# Patient Record
Sex: FEMALE | Race: BLACK OR AFRICAN AMERICAN | NOT HISPANIC OR LATINO | Employment: OTHER | URBAN - METROPOLITAN AREA
[De-identification: names, ages, dates, MRNs, and addresses within clinical notes are randomized per-mention and may not be internally consistent; named-entity substitution may affect disease eponyms.]

---

## 2019-05-14 ENCOUNTER — INITIAL PRENATAL (OUTPATIENT)
Dept: OBSTETRICS AND GYNECOLOGY | Facility: CLINIC | Age: 39
End: 2019-05-14

## 2019-05-14 VITALS
SYSTOLIC BLOOD PRESSURE: 130 MMHG | DIASTOLIC BLOOD PRESSURE: 84 MMHG | HEIGHT: 69 IN | WEIGHT: 293 LBS | BODY MASS INDEX: 43.4 KG/M2

## 2019-05-14 DIAGNOSIS — F55.8 ABUSE OF OTHER NON-PSYCHOACTIVE SUBSTANCES: ICD-10-CM

## 2019-05-14 DIAGNOSIS — R53.83 OTHER FATIGUE: ICD-10-CM

## 2019-05-14 DIAGNOSIS — E31.8 OTHER POLYGLANDULAR DYSFUNCTION (HCC): ICD-10-CM

## 2019-05-14 DIAGNOSIS — Z34.80 SUPERVISION OF OTHER NORMAL PREGNANCY, ANTEPARTUM: Primary | ICD-10-CM

## 2019-05-14 DIAGNOSIS — Z11.59 ENCOUNTER FOR SCREENING FOR OTHER VIRAL DISEASES: ICD-10-CM

## 2019-05-14 PROBLEM — O99.343 DEPRESSION AFFECTING PREGNANCY IN THIRD TRIMESTER, ANTEPARTUM: Status: ACTIVE | Noted: 2018-03-26

## 2019-05-14 PROBLEM — O09.529 AMA (ADVANCED MATERNAL AGE) MULTIGRAVIDA 35+: Status: ACTIVE | Noted: 2019-05-14

## 2019-05-14 PROBLEM — F32.A DEPRESSION AFFECTING PREGNANCY IN THIRD TRIMESTER, ANTEPARTUM: Status: ACTIVE | Noted: 2018-03-26

## 2019-05-14 PROBLEM — Z87.59 HISTORY OF PLACENTAL ABRUPTION: Status: ACTIVE | Noted: 2018-02-07

## 2019-05-14 PROBLEM — O99.419: Status: ACTIVE | Noted: 2018-03-26

## 2019-05-14 PROBLEM — I05.9: Status: ACTIVE | Noted: 2018-03-26

## 2019-05-14 LAB
B-HCG UR QL: POSITIVE
INTERNAL NEGATIVE CONTROL: NEGATIVE
INTERNAL POSITIVE CONTROL: POSITIVE
Lab: ABNORMAL

## 2019-05-14 PROCEDURE — 81025 URINE PREGNANCY TEST: CPT | Performed by: OBSTETRICS & GYNECOLOGY

## 2019-05-14 PROCEDURE — 99204 OFFICE O/P NEW MOD 45 MIN: CPT | Performed by: OBSTETRICS & GYNECOLOGY

## 2019-05-14 RX ORDER — DIPHENHYDRAMINE HYDROCHLORIDE 25 MG/1
25 CAPSULE ORAL 3 TIMES DAILY
Qty: 90 TABLET | Refills: 2 | Status: SHIPPED | OUTPATIENT
Start: 2019-05-14

## 2019-05-14 RX ORDER — PNV NO.95/FERROUS FUM/FOLIC AC 28MG-0.8MG
1 TABLET ORAL DAILY
Qty: 30 TABLET | Refills: 11 | Status: SHIPPED | OUTPATIENT
Start: 2019-05-14

## 2019-05-14 NOTE — PATIENT INSTRUCTIONS
"  Nausea/vomiting in pregnancy:  To help with nausea and vomiting you may try the following over the counter products:  Marium chews, marium tea, marium gum  Mint tea, peppermint gum or candy  B6/Doxylamine: to be taken daily, not just when symptoms occur  Pyridoxine (also known as B6): 10 to 25 mg orally every six to eight hours; the maximum treatment dose suggested for pregnant women is 200 mg/day.  Doxylamine (available in some over-the-counter sleeping pills (eg, Unisom Sleep Tabs) and as an antihistamine chewable tablet (Aldex AN)). One-half of the 25 mg over-the-counter tablet or two chewable 5 mg tablets can be used off-label as an antiemetic  · The [employer] of [employer] has released a smart phone application that can help you monitor and manage your symptoms.  The Application is \"Managing NVP,\" and has a green icon that says \"APGO WELLMOM.\"    If these do not work, we may need to try prescription medications.    Please contact us if you are unable to tolerate liquids (even sips of water) for over six to eight hours, have weight loss of over 10% of your body weight, blood in vomit, fever (temp of 100.4 or higher), or other concerning symptoms arise.          Travel During Pregnancy:  • Always use seatbelts.  A lap belt should be worn below the abdomen (across the hips) and the shoulder belt should be worn across the center of your chest (between the breasts) away from your neck.  Do not put the shoulder belt under your arm or behind your back.  Pull any slack out of the belt.  • Air travel is safe in most uncomplicated pregnancies, but we do not recommend air travel past 36 weeks.  Airlines may also have restrictions, so check with your airline before flying.  For some international flights, the travel cut off may be as early as 28 weeks gestation, and some airlines may require letters from your physician.  • When going on long trips in car, plane, train, or bus, frequent " ambulation is important to prevent blood clots in legs and/or lungs.  The following may help with prevention of blood clots in legs: Drink lots of fluid, wear loose-fitting clothing, walk and stretch at regular intervals.    • Avoid areas with Zika outbreaks.  For the latest information, you may visit: www.cdc.gov/travel/notices/.  Resources: , , Committee Opinion 455  Nutrition during pregnancy  • Average weight gain during pregnancy is based on your pre-pregnancy body mass index (BMI).  See below for recommended weight gain:  o Underweight (BMI <18.5), we recommend 28 to 40lb weight gain  o Normal weight (BMI 18.5 to 24.9), we recommend a 25 to 35lb weight gain  o Overweight (BMI 25-29.9), we recommend a 15 to 25lb weight gain  o Obese (BMI >30), we recommend keeping weight gain under 20 lbs.    • You should speak with your physician regarding your specific weight goals for this pregnancy.   • Foods to avoid include:   o Fish: avoid certain types of fish such as shark, swordfish, nancy mackerel, tilefish.  Limit white (albacore) tuna to 6 oz per week.  Choose fish and shellfish such as shrimp, salmon, catfish, [place].  o Food-borne illness: Pregnant women are much more likely to get Listeriosis than non-pregnant women.  To help prevent this, avoid eating unpasteurized milk and unpasteurized milk products, hot dogs/lunch meat/cold cuts unless they are heated until steaming right before serving, refrigerated meat spreads, refrigerated smoked seafood, raw or undercooked seafood/eggs/meat.   • Vitamin D helps development of the baby’s bones and teeth.  Good sources of Vitamin D include milk fortified with Vitamin D and fatty fish such as salmon.    • Folic acid, also known as folate, helps develop the baby’s brain and spine.  You should make sure your vitamin contains extra folic acid - at least 400mcg.    • Iron helps make red blood cells.  You need to make extra red blood cell sin pregnancy.  We  recommend eating Iron-rich foods such as lean red meat, poultry, fish, dried beans and peas, iron-fortified cereals, and prune juice.  You may be recommended an iron supplement.  If so, it is absorbed more easily if taken with vitamin C-rich foods such as citrus fruits or tomatoes.    • It is important to eat a well balanced diet.  A good recourse for nutrition recommendations is: www.choosemyplate.gov.  • Limit caffeine intake to 200mg daily.  Some coffees/teas/sodas have very different levels of caffeine per serving, so check the nutrition labeling.   Resources: , Committee Opinion 548  Exercise during pregnancy  • If you are healthy and your pregnancy is normal, it is safe to continue or start most types of exercise.   Physical activity does not increase your risk of miscarriage, low birth weight or early delivery, but you should discuss specific limitations or any complications with your physician.    • Benefits of exercise during pregnancy include: reduced back pain, less constipation, promotes overall health and healthy weight gain which may decrease risks for certain pregnancy complications such as diabetes and/or preeclampsia.  • The CDC recommends 150 minutes of moderate intensity aerobic exercise per week.  Moderate intensity means that you are moving enough to raise your heart rate and start sweating, but you can talk normally.  Brisk walking, swimming/water workouts, modified yoga/pilates, and use of elliptical machines and/or stationary bikes are examples of aerobic activity.    • Precautions:  o Stay hydrated.  Drink plenty of water before, during and after your workout  o Wear supportive clothing such as a sports bra  o Do not become overheated.  Do not exercise outside when it is very hot or humid  o Avoid lying flat on your back as much as possible  o AVOID contact sports, skydiving, sports that risk falling (such as skiing, surfing, off-road cycling, gymnastics, horseback riding), hot  yoga/hot pilates, scuba diving  • Stop exercising if you experience: bleeding from the vagina, feeling dizzy/faint, shortness of breath before starting exercise, chest pain, headache, muscle weakness, calf pain or swelling, regular uterine contractions, fluid leaking from the vagina.    • Postpartum exercise: Continuing exercise after you deliver your baby will help boost your energy, strengthen muscles, promote better sleep, and relieve stress.  It also may be useful in preventing postpartum depression.  150 minutes of moderate-intensity aerobic activity is recommended.  Types of exercise and when you can start a regular exercise routine may be limited by the type of delivery you had.  Please discuss with your physician prior to resuming or starting exercise.    Resources: ,   Back pain during pregnancy  • Back pain is very common during pregnancy.  It may arise due to strain on your back muscles, weakness of the abdominal muscles, and pregnancy hormones.    • To prevent back pain:  o Wear shoes with good support. Flat shoes and high heels may not have good arch support.  o Consider a firm mattress  o Use good lifting practices.  Do not bend from the waist to pick things up.  Squat down and bend your knees, keeping a straight back.  o Sit in chairs with good back support or use a small pillow behind your lower back.    o Sleep on your side with one or two pillows between your legs  • To ease back pain:   o Exercise can help stretch strained muscles and strengthen weak muscles to promote good posture  • Contact your health care provider with severe pain, pain that persists for more than 2 weeks, fever, burning during urination, or vaginal bleeding.  Resources:     The following are CPT codes for the optional tests in pregnancy:  Cystic fibrosis screenin  Spinal Muscular atrophy screening 75601  InformaSeq with XY (aneuploidy screening) 88593    The ICD 10 code for most pregnancies is  Z34.90

## 2019-05-14 NOTE — PROGRESS NOTES
Initial OB Visit    Chief Complaint   Patient presents with   • Initial Prenatal Visit        Chaim Degroot is being seen today for her first obstetrical visit.  She is a 38 y.o.    10w2d gestation.   FOB: [place]  This is not a planned pregnancy.   OB History    Para Term  AB Living   5 4 3 1   4   SAB TAB Ectopic Molar Multiple Live Births             4      # Outcome Date GA Lbr Aryan/2nd Weight Sex Delivery Anes PTL Lv   5 Current            4 Term 18 39w0d  2722 g (6 lb) F CS-LTranv   [facility]   3 Term 09/20/15 39w0d  3062 g (6 lb 12 oz) F CS-LTranv   [facility]   2  10/23/09 36w0d  1758 g (3 lb 14 oz) F CS-LTranv   [facility]   1 Term 01 40w0d  2438 g (5 lb 6 oz) F Vag-Spont   [facility]          Current obstetric complaints: mild nausea, no vomiting   Prior obstetric issues, potential pregnancy concerns: H/o 3 prior SVDs.  H/o postpartum preeclampsia. Denies h/o GDM, shoulder dystocia, third or fourth degree laceration  Family history of genetic issues (includes FOB): Denies  Prior infections concerning in pregnancy (Rash, fever since LMP): denies  Varicella Hx:immune by reported history  Prior genetic testing: unsure  History of abnormal pap smears: Denies  History of STIs: h/o chlamydia and trichomonas, denies h/o HSV    Past Medical History:   Diagnosis Date   • Hypertension     gestational   • Urogenital trichomoniasis        Past Surgical History:   Procedure Laterality Date   •  SECTION           Current Outpatient Medications:   •  doxylamine (UNISOM) 25 MG tablet, Take 0.5 tablets by mouth At Night As Needed for Nausea., Disp: 45 tablet, Rfl: 2  •  Prenatal Vit-Fe Fumarate-FA (PRENATAL VITAMIN) 27-0.8 MG tablet, Take 1 tablet by mouth Daily., Disp: 30 tablet, Rfl: 11  •  vitamin B-6 (PYRIDOXINE) 25 MG tablet, Take 1 tablet by mouth 3 (Three) Times a Day., Disp: 90 tablet, Rfl: 2    Allergies   Allergen Reactions   • Benzonatate Nausea Only   • Promethazine Nausea Only       Social  "History     Socioeconomic History   • Marital status: Unknown     Spouse name: Not on file   • Number of children: Not on file   • Years of education: Not on file   • Highest education level: Not on file   Tobacco Use   • Smoking status: Current Every Day Smoker     Packs/day: 0.25     Types: Cigarettes   • Smokeless tobacco: Never Used   Substance and Sexual Activity   • Alcohol use: No     Frequency: Never   • Drug use: Yes     Types: Marijuana   • Sexual activity: Yes     Partners: Male     Birth control/protection: None       Family History   Problem Relation Age of Onset   • Breast cancer Neg Hx    • Ovarian cancer Neg Hx    • Uterine cancer Neg Hx    • Colon cancer Neg Hx    • Deep vein thrombosis Neg Hx    • Pulmonary embolism Neg Hx        Review of systems     Constitutional: negative for chills, fevers and negative for fatigue  Eyes: negative  Ears, nose, mouth, throat, and face: negative for hearing loss and nasal congestion  Respiratory: negative for asthma and wheezing  Cardiovascular: negative for chest pain and dyspnea  Gastrointestinal: negative for dyspepsia, dysphagia abdominal pain  Genitourinary:negative for urinary incontinence  Integument/breast: negative for breast lump  Hematologic/lymphatic: negative for bleeding  Musculoskeletal:negative for aches  Neurological: negative for numbness/tingling  Behavioral/Psych: negative for anhedonia  Allergic/Immunologic: negative for rash, allergy         Objective    /84   Ht 175.3 cm (69\")   Wt (!) 164 kg (362 lb)   LMP 03/03/2019 (Exact Date)   BMI 53.46 kg/m²       General Appearance:    Alert, cooperative, in no acute distress, habitus obese   Head:    Normocephalic, without obvious abnormality, atraumatic   Eyes:            Lids and lashes normal, conjunctivae and sclerae normal, no   icterus, no pallor, corneas clear   Ears:    Ears appear intact with no abnormalities noted       Neck:   No adenopathy, supple, trachea midline, no " thyromegaly   Back:     No kyphosis present, no scoliosis present,                       Lungs:     Clear to auscultation,respirations regular, even and                   unlabored    Heart:    Regular rhythm and normal rate, normal S1 and S2, no            murmur, no gallop, no rub, no click   Breast Exam:    No masses, No nipple discharge   Abdomen:     Normal bowel sounds, no masses, no organomegaly, soft        non-tender, non-distended, no guarding, no rebound                 tenderness   Genitalia:    Vulva - BUS-WNL, NEFG    Vagina - No discharge, No bleeding    Cervix - No Lesions, closed     Uterus - Consistent with 10 weeks    Adnexa - No chad, NT    Pelvimetry - clinically adequate, gynecoid pelvis     Extremities:   Moves all extremities well, no edema, no cyanosis, no              redness   Pulses:   Pulses palpable and equal bilaterally   Skin:   No bleeding, bruising or rash   Lymph nodes:   No palpable adenopathy   Neurologic:   Sensation intact, A&O times 3      Assessment  Pregnancy at 10w2d  Obesity  AMA  H/o preeclampsia  H/o 3 prior CS     Plan  Initial labs drawn, GC/CHL screen done  Patient is on Prenatal vitamins  Problem list reviewed and updated.  Reviewed routine prenatal care with the office to include but not limited to:   Zika (travel restrictions/ok to use insect repellant), not to changing cat litter, food restrictions, avoidance of alcohol, tobacco and drugs and saunas/hot tubs, anticipated weight gain/nutrition requirements.  Reviewed nature of practice and hospital.  Reviewed recommended follow up, importance of compliance with care. We reviewed testing in pregnancy including HIV testing and urine drug screen.    Reviewed aneuploidy screening and CF/SMA screening.  We reviewed limitations of testing, possibility of false positive/negative results, possible need for other tests as indicated.  Patient is aware of the increased risk of aneuploidy given advanced maternal  age.  Reviewed with patient importance of compliance as she has been reported as noncompliant in her prior pregnancies.  We discussed the risk of multiple C-sections in the setting of placental abnormalities.  Patient is undecided what she would like for contraception postpartum.  Discussed if sterilization is desired, we typically have to sign papers at least 1 month prior to the surgery.  Recommend PNV, B6/doxylamine for nausea.    Counseled on limitations of ultrasound in pregnancy in detecting aneuploidy/fetal anomalies  All questions answered.   Return in about 1 week (around 5/21/2019) for dating US and 4 weeks for OB visit.      Kiesha Pugh MD

## 2019-05-15 DIAGNOSIS — R73.09 ELEVATED HEMOGLOBIN A1C: Primary | ICD-10-CM

## 2019-05-15 DIAGNOSIS — Z13.1 ENCOUNTER FOR SCREENING FOR DIABETES MELLITUS: ICD-10-CM

## 2019-05-15 LAB — HBA1C MFR BLD: 6 % (ref 4.8–5.6)

## 2019-05-16 ENCOUNTER — TELEPHONE (OUTPATIENT)
Dept: OBSTETRICS AND GYNECOLOGY | Facility: CLINIC | Age: 39
End: 2019-05-16

## 2019-05-16 LAB
ABO GROUP BLD: (no result)
BACTERIA UR CULT: NORMAL
BACTERIA UR CULT: NORMAL
BASOPHILS # BLD AUTO: 0 X10E3/UL (ref 0–0.2)
BASOPHILS NFR BLD AUTO: 0 %
BLD GP AB SCN SERPL QL: NEGATIVE
CONV .: NORMAL
CYTOLOGIST CVX/VAG CYTO: NORMAL
CYTOLOGY CVX/VAG DOC CYTO: NORMAL
CYTOLOGY CVX/VAG DOC THIN PREP: NORMAL
DX ICD CODE: NORMAL
EOSINOPHIL # BLD AUTO: 0.1 X10E3/UL (ref 0–0.4)
EOSINOPHIL NFR BLD AUTO: 1 %
ERYTHROCYTE [DISTWIDTH] IN BLOOD BY AUTOMATED COUNT: 16.5 % (ref 12.3–15.4)
HBV SURFACE AG SERPL QL IA: NEGATIVE
HCT VFR BLD AUTO: 32.8 % (ref 34–46.6)
HCV AB S/CO SERPL IA: <0.1 S/CO RATIO (ref 0–0.9)
HGB BLD-MCNC: 10.1 G/DL (ref 11.1–15.9)
HIV 1 & 2 AB SER-IMP: NORMAL
HIV 1+2 AB+HIV1 P24 AG SERPL QL IA: NON REACTIVE
IMM GRANULOCYTES # BLD AUTO: 0 X10E3/UL (ref 0–0.1)
IMM GRANULOCYTES NFR BLD AUTO: 0 %
LYMPHOCYTES # BLD AUTO: 2.2 X10E3/UL (ref 0.7–3.1)
LYMPHOCYTES NFR BLD AUTO: 24 %
MCH RBC QN AUTO: 25.4 PG (ref 26.6–33)
MCHC RBC AUTO-ENTMCNC: 30.8 G/DL (ref 31.5–35.7)
MCV RBC AUTO: 83 FL (ref 79–97)
MONOCYTES # BLD AUTO: 0.5 X10E3/UL (ref 0.1–0.9)
MONOCYTES NFR BLD AUTO: 6 %
NEUTROPHILS # BLD AUTO: 6.2 X10E3/UL (ref 1.4–7)
NEUTROPHILS NFR BLD AUTO: 69 %
OTHER STN SPEC: NORMAL
PLATELET # BLD AUTO: 392 X10E3/UL (ref 150–379)
RBC # BLD AUTO: 3.97 X10E6/UL (ref 3.77–5.28)
RH BLD: POSITIVE
RPR SER QL: NON REACTIVE
RUBV IGG SERPL IA-ACNC: 1.71 INDEX
STAT OF ADQ CVX/VAG CYTO-IMP: NORMAL
WBC # BLD AUTO: 9 X10E3/UL (ref 3.4–10.8)

## 2019-05-16 RX ORDER — DOCUSATE SODIUM 100 MG/1
100 CAPSULE, LIQUID FILLED ORAL 2 TIMES DAILY
Qty: 60 CAPSULE | Refills: 1 | Status: SHIPPED | OUTPATIENT
Start: 2019-05-16

## 2019-05-16 RX ORDER — FERROUS SULFATE 325(65) MG
325 TABLET ORAL
Qty: 30 TABLET | Refills: 5 | Status: SHIPPED | OUTPATIENT
Start: 2019-05-16 | End: 2019-07-02

## 2019-05-16 NOTE — TELEPHONE ENCOUNTER
Please let her know her Pap smear was normal.  Her blood count was slightly low; I would recommend starting an iron supplementation and stool softener.  Prescriptions sent to pharmacy

## 2019-05-17 ENCOUNTER — TELEPHONE (OUTPATIENT)
Dept: OBSTETRICS AND GYNECOLOGY | Facility: CLINIC | Age: 39
End: 2019-05-17

## 2019-05-17 DIAGNOSIS — O09.523 ELDERLY MULTIGRAVIDA IN THIRD TRIMESTER: Primary | ICD-10-CM

## 2019-05-17 NOTE — TELEPHONE ENCOUNTER
[facility] pt called and would like to know if you could send in something for her for nausea.     She would also like to know if she could get the informaseq testing done.       Thank you

## 2019-05-17 NOTE — TELEPHONE ENCOUNTER
"----- Message from Kiesha Pugh MD sent at 5/15/2019 11:45 PM EDT -----  Please let patient know her A1C was elevated, recommend early 1 hour GCT.  Order placed.    Called pt to inform results. In the middle of informing her results she said: \"hold up repetitive for several times, was on hold for more than 3 minutes.    Called pt again, no answer left a message to return call.   "

## 2019-05-17 NOTE — TELEPHONE ENCOUNTER
Dr. Pugh sent in a prescription for vitamin C B6 and doxylamine.  This is the first thing that she should take for nausea and vomiting in pregnancy.  She should take the vitamin B6 3 times a day.  She may also add the doxylamine to this up to 3 times per day.  Doxylamine may cause drowsiness.  If it is causing her to be too drowsy, she may take it just prior to bed.    I ordered the Informaseq.  She may come into either [facility] or [facility] to have it drawn at her convenience

## 2019-05-18 LAB
AMPHETAMINES UR QL SCN: NEGATIVE NG/ML
BARBITURATES UR QL SCN: NEGATIVE NG/ML
BENZODIAZ UR QL: NEGATIVE NG/ML
BZE UR QL: NEGATIVE NG/ML
C TRACH RRNA SPEC QL NAA+PROBE: NEGATIVE
CANNABINOIDS UR QL SCN: POSITIVE
METHADONE UR QL SCN: NEGATIVE NG/ML
N GONORRHOEA RRNA SPEC QL NAA+PROBE: NEGATIVE
OPIATES UR QL: NEGATIVE NG/ML
PCP UR QL: NEGATIVE NG/ML
PROPOXYPH UR QL SCN: NEGATIVE NG/ML
T VAGINALIS RRNA VAG QL NAA+PROBE: NEGATIVE

## 2019-05-21 ENCOUNTER — PROCEDURE VISIT (OUTPATIENT)
Dept: OBSTETRICS AND GYNECOLOGY | Facility: CLINIC | Age: 39
End: 2019-05-21

## 2019-05-21 ENCOUNTER — TELEPHONE (OUTPATIENT)
Dept: OBSTETRICS AND GYNECOLOGY | Facility: CLINIC | Age: 39
End: 2019-05-21

## 2019-05-21 DIAGNOSIS — Z34.80 SUPERVISION OF OTHER NORMAL PREGNANCY, ANTEPARTUM: Primary | ICD-10-CM

## 2019-05-21 DIAGNOSIS — Z34.91 PREGNANCY WITH UNCERTAIN DATES IN FIRST TRIMESTER: Primary | ICD-10-CM

## 2019-05-21 PROCEDURE — 76817 TRANSVAGINAL US OBSTETRIC: CPT | Performed by: OBSTETRICS & GYNECOLOGY

## 2019-05-21 NOTE — TELEPHONE ENCOUNTER
Please let patient know that we will likely need to get a repeat hcg in 48 hours.  She will also need to do patterning likely for elevated A1C.  Please schedule her a follow up next week to review. Thanks!

## 2019-05-22 ENCOUNTER — TELEPHONE (OUTPATIENT)
Dept: OBSTETRICS AND GYNECOLOGY | Facility: CLINIC | Age: 39
End: 2019-05-22

## 2019-05-22 DIAGNOSIS — F32.A DEPRESSION, UNSPECIFIED DEPRESSION TYPE: Primary | ICD-10-CM

## 2019-05-22 LAB — HCG INTACT+B SERPL-ACNC: NORMAL MIU/ML

## 2019-05-22 NOTE — TELEPHONE ENCOUNTER
11 wk ob pt called to report symptoms of depression.  She denies a desire to harm herself or anyone else.  She was very tearful on the phone, speaking about her other children.  Pt does feel like counseling may help.  If you agree, please place referral and I will by happy to schedule her. Please advise.    Pt # [phone]

## 2019-05-22 NOTE — TELEPHONE ENCOUNTER
Referral placed.  If unable to get her scheduled soon, please schedule her for follow-up with me in the near future and to see within the next week). Thanks!

## 2019-05-22 NOTE — TELEPHONE ENCOUNTER
L/m for pt regarding referral to Psychology for depression.      Please inform pt, she may call [facility] at [phone] to schedule an appointment.    If she feels like she is in crisis, she may call [facility] at [phone].

## 2019-05-22 NOTE — TELEPHONE ENCOUNTER
----- Message from Kiesha Pugh MD sent at 5/20/2019 10:24 AM EDT -----  Please inform patient of negative gonorrhea/chlamydia/trichomonas testing    -Called patient to inform results, no answer. Left a message to return call.

## 2019-05-22 NOTE — TELEPHONE ENCOUNTER
Patient's pregnancy hormone level was greater than 23,000.  I would like the patient to come in for repeat ultrasound on Friday at her [place] location if possible as we were unable to see a definitive intrauterine pregnancy on her last exam.

## 2019-05-24 ENCOUNTER — PROCEDURE VISIT (OUTPATIENT)
Dept: OBSTETRICS AND GYNECOLOGY | Facility: CLINIC | Age: 39
End: 2019-05-24

## 2019-05-24 DIAGNOSIS — E66.01 MATERNAL MORBID OBESITY, ANTEPARTUM, FIRST TRIMESTER (HCC): ICD-10-CM

## 2019-05-24 DIAGNOSIS — O99.211 MATERNAL MORBID OBESITY, ANTEPARTUM, FIRST TRIMESTER (HCC): ICD-10-CM

## 2019-05-24 DIAGNOSIS — O36.80X0 PREGNANCY WITH UNCERTAIN FETAL VIABILITY, SINGLE OR UNSPECIFIED FETUS: Primary | ICD-10-CM

## 2019-05-24 DIAGNOSIS — O09.521 ELDERLY MULTIGRAVIDA IN FIRST TRIMESTER: ICD-10-CM

## 2019-05-24 PROCEDURE — 76817 TRANSVAGINAL US OBSTETRIC: CPT | Performed by: OBSTETRICS & GYNECOLOGY

## 2019-06-04 ENCOUNTER — TELEPHONE (OUTPATIENT)
Dept: OBSTETRICS AND GYNECOLOGY | Facility: CLINIC | Age: 39
End: 2019-06-04

## 2019-06-04 ENCOUNTER — ROUTINE PRENATAL (OUTPATIENT)
Dept: OBSTETRICS AND GYNECOLOGY | Facility: CLINIC | Age: 39
End: 2019-06-04

## 2019-06-04 ENCOUNTER — PROCEDURE VISIT (OUTPATIENT)
Dept: OBSTETRICS AND GYNECOLOGY | Facility: CLINIC | Age: 39
End: 2019-06-04

## 2019-06-04 VITALS — BODY MASS INDEX: 54.2 KG/M2 | SYSTOLIC BLOOD PRESSURE: 113 MMHG | WEIGHT: 293 LBS | DIASTOLIC BLOOD PRESSURE: 72 MMHG

## 2019-06-04 DIAGNOSIS — O36.80X0 PREGNANCY WITH UNCERTAIN FETAL VIABILITY, SINGLE OR UNSPECIFIED FETUS: Primary | ICD-10-CM

## 2019-06-04 PROCEDURE — 76817 TRANSVAGINAL US OBSTETRIC: CPT | Performed by: OBSTETRICS & GYNECOLOGY

## 2019-06-04 PROCEDURE — 99213 OFFICE O/P EST LOW 20 MIN: CPT | Performed by: OBSTETRICS & GYNECOLOGY

## 2019-06-04 NOTE — TELEPHONE ENCOUNTER
Called patient back.  She had questions about her hcg.  Reviewed with patient that her hCG level was greater than 23,000 and typically once we see a crown-rump length on ultrasound we just follow the ultrasonographic findings to see if a fetal heartbeat develops.  Reviewed with patient that I do want her to follow-up on Thursday for repeat ultrasound and patient agrees.  She is having no bleeding or cramping at this time.  She expressed understanding of the plan.

## 2019-06-04 NOTE — TELEPHONE ENCOUNTER
Pt called, would like a call back from you to discuss ultrasound. Pt states that she's confused and nervous about what she was told. Pt can be reached at [phone]

## 2019-06-06 ENCOUNTER — TELEPHONE (OUTPATIENT)
Dept: OBSTETRICS AND GYNECOLOGY | Facility: CLINIC | Age: 39
End: 2019-06-06

## 2019-06-06 ENCOUNTER — ROUTINE PRENATAL (OUTPATIENT)
Dept: OBSTETRICS AND GYNECOLOGY | Facility: CLINIC | Age: 39
End: 2019-06-06

## 2019-06-06 ENCOUNTER — PROCEDURE VISIT (OUTPATIENT)
Dept: OBSTETRICS AND GYNECOLOGY | Facility: CLINIC | Age: 39
End: 2019-06-06

## 2019-06-06 VITALS — WEIGHT: 293 LBS | BODY MASS INDEX: 53.9 KG/M2 | DIASTOLIC BLOOD PRESSURE: 71 MMHG | SYSTOLIC BLOOD PRESSURE: 117 MMHG

## 2019-06-06 DIAGNOSIS — O36.80X0 PREGNANCY WITH UNCERTAIN FETAL VIABILITY, SINGLE OR UNSPECIFIED FETUS: ICD-10-CM

## 2019-06-06 DIAGNOSIS — O02.1 MISSED ABORTION: ICD-10-CM

## 2019-06-06 DIAGNOSIS — O99.211 MATERNAL MORBID OBESITY, ANTEPARTUM, FIRST TRIMESTER (HCC): Primary | ICD-10-CM

## 2019-06-06 DIAGNOSIS — O09.521 ELDERLY MULTIGRAVIDA IN FIRST TRIMESTER: ICD-10-CM

## 2019-06-06 DIAGNOSIS — O02.1 EMBRYONIC DEMISE: Primary | ICD-10-CM

## 2019-06-06 DIAGNOSIS — E66.01 MATERNAL MORBID OBESITY, ANTEPARTUM, FIRST TRIMESTER (HCC): Primary | ICD-10-CM

## 2019-06-06 PROCEDURE — 76817 TRANSVAGINAL US OBSTETRIC: CPT | Performed by: OBSTETRICS & GYNECOLOGY

## 2019-06-06 PROCEDURE — 99213 OFFICE O/P EST LOW 20 MIN: CPT | Performed by: OBSTETRICS & GYNECOLOGY

## 2019-06-06 RX ORDER — HYDROXYZINE HYDROCHLORIDE 25 MG/1
25 TABLET, FILM COATED ORAL EVERY 8 HOURS PRN
Qty: 20 TABLET | Refills: 0 | Status: SHIPPED | OUTPATIENT
Start: 2019-06-06 | End: 2019-07-02

## 2019-06-06 NOTE — TELEPHONE ENCOUNTER
Today, the patient and I did not speak of her mental health.  She did not report any anxiety or ask for an anxiolytic.  I do not prescribe benzodiazepines as an outpatient.  I am comfortable with giving her hydroxyzine or Vistaril for anxiety but please caution the patient that this may cause sedation and I do not recommend taking it while being the primary caregiver to children or driving while taking the medication.  She can be seen tomorrow in the office if she would like to discuss this further.  If she has a mental health provider, would recommend follow-up with them.

## 2019-06-06 NOTE — PROGRESS NOTES
SUBJECTIVE:   Chaim Degroot is a 39 y.o.  who presents today with an embryonic demise based upon ultrasound today showing absent fetal heart tones with an ultrasound approximately 2 weeks ago that showed gestational sac, yolk sac, crown-rump length.   She reports no vaginal bleeding and no cramping.  Her prior pregnancy history is significant for prior  section x3, most recent in 2018.   She denies any fever/chills.    Past Medical History:   Diagnosis Date   • Hypertension     gestational   • Urogenital trichomoniasis       Past Surgical History:   Procedure Laterality Date   •  SECTION        Social History     Tobacco Use   • Smoking status: Current Every Day Smoker     Packs/day: 0.25     Types: Cigarettes   • Smokeless tobacco: Never Used   Substance Use Topics   • Alcohol use: No     Frequency: Never   • Drug use: Yes     Types: Marijuana        OB History    Para Term  AB Living   8 5 3 2 2 4   SAB TAB Ectopic Molar Multiple Live Births   2         4      # Outcome Date GA Lbr Aryan/2nd Weight Sex Delivery Anes PTL Lv   8 Current            7 Term 18 39w0d  2722 g (6 lb) F CS-LTranv   [facility]   6 Term 09/20/15 37w0d  3062 g (6 lb 12 oz) F CS-LTranv   [facility]   5  10/23/09 29w0d  1758 g (3 lb 14 oz) F CS-LTranv   [facility]   4   24w0d    Vag-Spont   FD   3 SAB            2 SAB            1 Term 01 40w0d  2438 g (5 lb 6 oz) F Vag-Spont   [facility]MER          OBJECTIVE:   Vitals:    19 1442   BP: 117/71   Weight: (!) 166 kg (365 lb)      Physical Examination:    General appearance - alert, well appearing, and in no distress    Chest - no tachypnea, retractions or cyanosis   CV- well perfused, no cyanosis   Abdomen - soft, nontender, nondistended, no masses or organomegaly    Pelvic - exam declined by the patient   Neurological - alert, oriented, normal speech, no focal findings or movement disorder noted   Musculoskeletal - no joint tenderness,  deformity or swelling    Extremities - no pedal edema noted   Skin - normal coloration and turgor, no rashes, no suspicious skin lesions noted      Ultrasound crown-rump length 0.48 cm consistent with 6 weeks 1 day absent fetal heartbeat, right complex ovarian cyst 2.12 cm    ASSESSMENT:   Embryonic demise  O positive  H/o C section    PLAN:     Patient was informed of the diagnosis of embryonic demise.  We discussed that given absent fetal heart tones today and US 5/24 showing gestational sac with yolk sac and CRL, this pregnancy is unsuccessful. Patient's questions were answered and condolences were offered. Patient was counseled on options including expectant management, dilation and curettage, and medical management with misoprostol.  Patient was informed of the risks of each procedure including but not limited to bleeding, infection, risks of anesthesia, risks of injury to uterus (such as uterine perforation), need for other surgeries and/or procedures.  She was informed that medical management is often successful but ~15% of patients (exact risk depending on gestational age at time of demise) may require a dilation and curettage secondary to failure of the medication.   Patient chose expectant management at this time.  She will consider her other treatment options and follow-up next week.  Patient was rushed to leave the office due to childcare obligations.  I reviewed with the patient that if she were to choose medical management at home she would need assistance and supervising and caring for her children.  Also reviewed that if she were to choose a D&C, we recommend adult supervision for 24 hours postanesthesia. She was informed to follow-up next week and call with any abnormal bleeding or cramping.  Rh Positive  Contraception: considering permanent sterilization  All questions were answered to patient's apparent satisfaction upon conclusion of our discussion.      ADDENDUM: Pt called after leaving visit  asking for anxiolytic.  During her visit, her mood seemed appropriate and she did not request any medication for this.  I recommended follow-up to discuss further

## 2019-06-07 NOTE — TELEPHONE ENCOUNTER
Called pt to relay Dr. Pugh's message. No answer left a message to return call.     -pt does not need untrasound on 6/11 she needs a gyn follow up not an ob follow up

## 2019-06-12 ENCOUNTER — TELEPHONE (OUTPATIENT)
Dept: OBSTETRICS AND GYNECOLOGY | Facility: CLINIC | Age: 39
End: 2019-06-12

## 2019-06-12 NOTE — TELEPHONE ENCOUNTER
"Called pt , she states she was not able to come yesterday due to needing to take her daughter to [facility].  Pt is wanting to r/s, does she need an u/s as well? I saw that this was scheduled for yesterday also, but it looked like an old appointment.  Pt states she has not started bleeding yet and is requesting the 4 vaginal pills to \"start the process\".  Pt is also requesting Xanax for anxiety, she states she is having a hard time sleeping. She said Xanax has worked in the past.  [place]  "

## 2019-06-12 NOTE — TELEPHONE ENCOUNTER
Please call patient as she was diagnosed with embryonic demise last week.  Desired expectant management and was supposed to follow up. Thanks!

## 2019-06-12 NOTE — TELEPHONE ENCOUNTER
She needs a GYN follow up.  I would like to review use of the medication in person prior to her taking the medication to make sure instructions are reviewed.  I do not routinely prescribe Xanax.  If she has a mental health provider, I would recommend contacting them for follow up.  She can be overbooked Friday afternoon at 2/2:15. Thanks!

## 2019-06-14 ENCOUNTER — TELEPHONE (OUTPATIENT)
Dept: OBSTETRICS AND GYNECOLOGY | Facility: CLINIC | Age: 39
End: 2019-06-14

## 2019-06-14 NOTE — TELEPHONE ENCOUNTER
Please call patient and reschedule.  If unable to reach patient, please send her a letter (certified and non certified) stating that we recommend she get medical attention as not following up may be detrimental to her health. Thanks!

## 2019-06-17 NOTE — TELEPHONE ENCOUNTER
Called pt as she missed her last two appt with Dr. Pugh. No phone answer and not voicemail box available to leave a message. Certified and non certified letter mailed.

## 2019-06-21 ENCOUNTER — HOSPITAL ENCOUNTER (OUTPATIENT)
Facility: HOSPITAL | Age: 39
Discharge: HOME | End: 2019-06-22
Attending: OBSTETRICS & GYNECOLOGY | Admitting: OBSTETRICS & GYNECOLOGY

## 2019-06-21 ENCOUNTER — ANESTHESIA (OUTPATIENT)
Dept: PERIOP | Facility: HOSPITAL | Age: 39
End: 2019-06-21

## 2019-06-21 ENCOUNTER — ANESTHESIA EVENT (OUTPATIENT)
Dept: PERIOP | Facility: HOSPITAL | Age: 39
End: 2019-06-21

## 2019-06-21 DIAGNOSIS — O03.4 INCOMPLETE MISCARRIAGE: ICD-10-CM

## 2019-06-21 LAB
ABO GROUP BLD: NORMAL
BLD GP AB SCN SERPL QL: NEGATIVE
DEPRECATED RDW RBC AUTO: 49.9 FL (ref 37–54)
ERYTHROCYTE [DISTWIDTH] IN BLOOD BY AUTOMATED COUNT: 15.9 % (ref 12.3–15.4)
HCT VFR BLD AUTO: 27.8 % (ref 34–46.6)
HGB BLD-MCNC: 8.3 G/DL (ref 12–15.9)
MCH RBC QN AUTO: 25.9 PG (ref 26.6–33)
MCHC RBC AUTO-ENTMCNC: 29.9 G/DL (ref 31.5–35.7)
MCV RBC AUTO: 86.6 FL (ref 79–97)
PLATELET # BLD AUTO: 272 10*3/MM3 (ref 140–450)
PMV BLD AUTO: 10.8 FL (ref 6–12)
RBC # BLD AUTO: 3.21 10*6/MM3 (ref 3.77–5.28)
RH BLD: POSITIVE
T&S EXPIRATION DATE: NORMAL
WBC NRBC COR # BLD: 9.25 10*3/MM3 (ref 3.4–10.8)

## 2019-06-21 PROCEDURE — 85027 COMPLETE CBC AUTOMATED: CPT | Performed by: OBSTETRICS & GYNECOLOGY

## 2019-06-21 PROCEDURE — 59812 TREATMENT OF MISCARRIAGE: CPT | Performed by: OBSTETRICS & GYNECOLOGY

## 2019-06-21 PROCEDURE — 25010000002 PHENYLEPHRINE PER 1 ML: Performed by: ANESTHESIOLOGY

## 2019-06-21 PROCEDURE — 86900 BLOOD TYPING SEROLOGIC ABO: CPT | Performed by: OBSTETRICS & GYNECOLOGY

## 2019-06-21 PROCEDURE — 25010000002 DEXAMETHASONE PER 1 MG: Performed by: ANESTHESIOLOGY

## 2019-06-21 PROCEDURE — G0378 HOSPITAL OBSERVATION PER HR: HCPCS

## 2019-06-21 PROCEDURE — 86901 BLOOD TYPING SEROLOGIC RH(D): CPT | Performed by: OBSTETRICS & GYNECOLOGY

## 2019-06-21 PROCEDURE — 25010000002 FENTANYL CITRATE (PF) 100 MCG/2ML SOLUTION: Performed by: ANESTHESIOLOGY

## 2019-06-21 PROCEDURE — 88305 TISSUE EXAM BY PATHOLOGIST: CPT | Performed by: OBSTETRICS & GYNECOLOGY

## 2019-06-21 PROCEDURE — 86850 RBC ANTIBODY SCREEN: CPT | Performed by: OBSTETRICS & GYNECOLOGY

## 2019-06-21 PROCEDURE — S0260 H&P FOR SURGERY: HCPCS | Performed by: OBSTETRICS & GYNECOLOGY

## 2019-06-21 PROCEDURE — 25010000002 ONDANSETRON PER 1 MG: Performed by: ANESTHESIOLOGY

## 2019-06-21 PROCEDURE — 25010000002 MIDAZOLAM PER 1 MG: Performed by: ANESTHESIOLOGY

## 2019-06-21 PROCEDURE — 25010000002 PROPOFOL 10 MG/ML EMULSION: Performed by: ANESTHESIOLOGY

## 2019-06-21 PROCEDURE — 25010000002 SUCCINYLCHOLINE PER 20 MG: Performed by: ANESTHESIOLOGY

## 2019-06-21 RX ORDER — OXYCODONE HYDROCHLORIDE AND ACETAMINOPHEN 5; 325 MG/1; MG/1
1 TABLET ORAL EVERY 4 HOURS PRN
Status: DISCONTINUED | OUTPATIENT
Start: 2019-06-21 | End: 2019-06-22 | Stop reason: HOSPADM

## 2019-06-21 RX ORDER — DIPHENHYDRAMINE HCL 25 MG
25 CAPSULE ORAL
Status: DISCONTINUED | OUTPATIENT
Start: 2019-06-21 | End: 2019-06-21 | Stop reason: HOSPADM

## 2019-06-21 RX ORDER — ONDANSETRON 4 MG/1
4 TABLET, FILM COATED ORAL EVERY 6 HOURS PRN
Status: DISCONTINUED | OUTPATIENT
Start: 2019-06-21 | End: 2019-06-22 | Stop reason: HOSPADM

## 2019-06-21 RX ORDER — DIPHENHYDRAMINE HYDROCHLORIDE 50 MG/ML
12.5 INJECTION INTRAMUSCULAR; INTRAVENOUS
Status: DISCONTINUED | OUTPATIENT
Start: 2019-06-21 | End: 2019-06-21 | Stop reason: HOSPADM

## 2019-06-21 RX ORDER — SODIUM CHLORIDE 0.9 % (FLUSH) 0.9 %
1-10 SYRINGE (ML) INJECTION AS NEEDED
Status: DISCONTINUED | OUTPATIENT
Start: 2019-06-21 | End: 2019-06-21 | Stop reason: HOSPADM

## 2019-06-21 RX ORDER — MIDAZOLAM HYDROCHLORIDE 1 MG/ML
1 INJECTION INTRAMUSCULAR; INTRAVENOUS
Status: DISCONTINUED | OUTPATIENT
Start: 2019-06-21 | End: 2019-06-21 | Stop reason: HOSPADM

## 2019-06-21 RX ORDER — ONDANSETRON 2 MG/ML
4 INJECTION INTRAMUSCULAR; INTRAVENOUS ONCE AS NEEDED
Status: DISCONTINUED | OUTPATIENT
Start: 2019-06-21 | End: 2019-06-21 | Stop reason: HOSPADM

## 2019-06-21 RX ORDER — NAPROXEN 250 MG/1
250 TABLET ORAL 2 TIMES DAILY PRN
Status: DISCONTINUED | OUTPATIENT
Start: 2019-06-21 | End: 2019-06-22 | Stop reason: HOSPADM

## 2019-06-21 RX ORDER — FENTANYL CITRATE 50 UG/ML
INJECTION, SOLUTION INTRAMUSCULAR; INTRAVENOUS
Status: DISPENSED
Start: 2019-06-21 | End: 2019-06-22

## 2019-06-21 RX ORDER — PROMETHAZINE HYDROCHLORIDE 25 MG/1
25 TABLET ORAL ONCE AS NEEDED
Status: DISCONTINUED | OUTPATIENT
Start: 2019-06-21 | End: 2019-06-21 | Stop reason: HOSPADM

## 2019-06-21 RX ORDER — MAGNESIUM HYDROXIDE 1200 MG/15ML
LIQUID ORAL AS NEEDED
Status: DISCONTINUED | OUTPATIENT
Start: 2019-06-21 | End: 2019-06-21 | Stop reason: HOSPADM

## 2019-06-21 RX ORDER — OXYTOCIN 10 [USP'U]/ML
INJECTION, SOLUTION INTRAMUSCULAR; INTRAVENOUS
Status: DISPENSED
Start: 2019-06-21 | End: 2019-06-22

## 2019-06-21 RX ORDER — PROPOFOL 10 MG/ML
VIAL (ML) INTRAVENOUS AS NEEDED
Status: DISCONTINUED | OUTPATIENT
Start: 2019-06-21 | End: 2019-06-21 | Stop reason: SURG

## 2019-06-21 RX ORDER — LIDOCAINE HYDROCHLORIDE 20 MG/ML
INJECTION, SOLUTION INFILTRATION; PERINEURAL AS NEEDED
Status: DISCONTINUED | OUTPATIENT
Start: 2019-06-21 | End: 2019-06-21 | Stop reason: SURG

## 2019-06-21 RX ORDER — ACETAMINOPHEN 325 MG/1
650 TABLET ORAL ONCE AS NEEDED
Status: DISCONTINUED | OUTPATIENT
Start: 2019-06-21 | End: 2019-06-21 | Stop reason: HOSPADM

## 2019-06-21 RX ORDER — PROMETHAZINE HYDROCHLORIDE 25 MG/1
25 SUPPOSITORY RECTAL ONCE AS NEEDED
Status: DISCONTINUED | OUTPATIENT
Start: 2019-06-21 | End: 2019-06-21 | Stop reason: HOSPADM

## 2019-06-21 RX ORDER — FENTANYL CITRATE 50 UG/ML
INJECTION, SOLUTION INTRAMUSCULAR; INTRAVENOUS AS NEEDED
Status: DISCONTINUED | OUTPATIENT
Start: 2019-06-21 | End: 2019-06-21 | Stop reason: SURG

## 2019-06-21 RX ORDER — EPHEDRINE SULFATE 50 MG/ML
5 INJECTION, SOLUTION INTRAVENOUS ONCE AS NEEDED
Status: DISCONTINUED | OUTPATIENT
Start: 2019-06-21 | End: 2019-06-21 | Stop reason: HOSPADM

## 2019-06-21 RX ORDER — HYDROCODONE BITARTRATE AND ACETAMINOPHEN 7.5; 325 MG/1; MG/1
1 TABLET ORAL ONCE AS NEEDED
Status: DISCONTINUED | OUTPATIENT
Start: 2019-06-21 | End: 2019-06-21 | Stop reason: HOSPADM

## 2019-06-21 RX ORDER — FENTANYL CITRATE 50 UG/ML
50 INJECTION, SOLUTION INTRAMUSCULAR; INTRAVENOUS
Status: DISCONTINUED | OUTPATIENT
Start: 2019-06-21 | End: 2019-06-21 | Stop reason: HOSPADM

## 2019-06-21 RX ORDER — SUCCINYLCHOLINE CHLORIDE 20 MG/ML
INJECTION INTRAMUSCULAR; INTRAVENOUS AS NEEDED
Status: DISCONTINUED | OUTPATIENT
Start: 2019-06-21 | End: 2019-06-21 | Stop reason: SURG

## 2019-06-21 RX ORDER — NALOXONE HCL 0.4 MG/ML
0.2 VIAL (ML) INJECTION AS NEEDED
Status: DISCONTINUED | OUTPATIENT
Start: 2019-06-21 | End: 2019-06-21 | Stop reason: HOSPADM

## 2019-06-21 RX ORDER — ONDANSETRON 2 MG/ML
4 INJECTION INTRAMUSCULAR; INTRAVENOUS EVERY 6 HOURS PRN
Status: DISCONTINUED | OUTPATIENT
Start: 2019-06-21 | End: 2019-06-22 | Stop reason: HOSPADM

## 2019-06-21 RX ORDER — FAMOTIDINE 10 MG/ML
20 INJECTION, SOLUTION INTRAVENOUS ONCE
Status: COMPLETED | OUTPATIENT
Start: 2019-06-21 | End: 2019-06-21

## 2019-06-21 RX ORDER — LIDOCAINE HYDROCHLORIDE 10 MG/ML
0.5 INJECTION, SOLUTION EPIDURAL; INFILTRATION; INTRACAUDAL; PERINEURAL ONCE AS NEEDED
Status: DISCONTINUED | OUTPATIENT
Start: 2019-06-21 | End: 2019-06-21 | Stop reason: HOSPADM

## 2019-06-21 RX ORDER — DEXTROSE AND SODIUM CHLORIDE 5; .45 G/100ML; G/100ML
100 INJECTION, SOLUTION INTRAVENOUS CONTINUOUS
Status: DISCONTINUED | OUTPATIENT
Start: 2019-06-21 | End: 2019-06-22 | Stop reason: HOSPADM

## 2019-06-21 RX ORDER — ONDANSETRON 2 MG/ML
INJECTION INTRAMUSCULAR; INTRAVENOUS AS NEEDED
Status: DISCONTINUED | OUTPATIENT
Start: 2019-06-21 | End: 2019-06-21 | Stop reason: SURG

## 2019-06-21 RX ORDER — HYDROXYZINE HYDROCHLORIDE 25 MG/1
25 TABLET, FILM COATED ORAL EVERY 8 HOURS PRN
Status: DISCONTINUED | OUTPATIENT
Start: 2019-06-21 | End: 2019-06-22 | Stop reason: HOSPADM

## 2019-06-21 RX ORDER — FLUMAZENIL 0.1 MG/ML
0.2 INJECTION INTRAVENOUS AS NEEDED
Status: DISCONTINUED | OUTPATIENT
Start: 2019-06-21 | End: 2019-06-21 | Stop reason: HOSPADM

## 2019-06-21 RX ORDER — PROMETHAZINE HYDROCHLORIDE 25 MG/ML
6.25 INJECTION, SOLUTION INTRAMUSCULAR; INTRAVENOUS
Status: DISCONTINUED | OUTPATIENT
Start: 2019-06-21 | End: 2019-06-21 | Stop reason: HOSPADM

## 2019-06-21 RX ORDER — DEXAMETHASONE SODIUM PHOSPHATE 10 MG/ML
INJECTION INTRAMUSCULAR; INTRAVENOUS AS NEEDED
Status: DISCONTINUED | OUTPATIENT
Start: 2019-06-21 | End: 2019-06-21 | Stop reason: SURG

## 2019-06-21 RX ORDER — OXYCODONE AND ACETAMINOPHEN 7.5; 325 MG/1; MG/1
1 TABLET ORAL ONCE AS NEEDED
Status: DISCONTINUED | OUTPATIENT
Start: 2019-06-21 | End: 2019-06-21 | Stop reason: HOSPADM

## 2019-06-21 RX ORDER — HYDRALAZINE HYDROCHLORIDE 20 MG/ML
5 INJECTION INTRAMUSCULAR; INTRAVENOUS
Status: DISCONTINUED | OUTPATIENT
Start: 2019-06-21 | End: 2019-06-21 | Stop reason: HOSPADM

## 2019-06-21 RX ORDER — MIDAZOLAM HYDROCHLORIDE 1 MG/ML
2 INJECTION INTRAMUSCULAR; INTRAVENOUS
Status: DISCONTINUED | OUTPATIENT
Start: 2019-06-21 | End: 2019-06-21 | Stop reason: HOSPADM

## 2019-06-21 RX ORDER — SODIUM CHLORIDE, SODIUM LACTATE, POTASSIUM CHLORIDE, CALCIUM CHLORIDE 600; 310; 30; 20 MG/100ML; MG/100ML; MG/100ML; MG/100ML
9 INJECTION, SOLUTION INTRAVENOUS CONTINUOUS
Status: DISCONTINUED | OUTPATIENT
Start: 2019-06-21 | End: 2019-06-21

## 2019-06-21 RX ORDER — PROMETHAZINE HYDROCHLORIDE 25 MG/ML
12.5 INJECTION, SOLUTION INTRAMUSCULAR; INTRAVENOUS ONCE AS NEEDED
Status: DISCONTINUED | OUTPATIENT
Start: 2019-06-21 | End: 2019-06-21 | Stop reason: HOSPADM

## 2019-06-21 RX ORDER — HYDROMORPHONE HYDROCHLORIDE 1 MG/ML
0.5 INJECTION, SOLUTION INTRAMUSCULAR; INTRAVENOUS; SUBCUTANEOUS
Status: DISCONTINUED | OUTPATIENT
Start: 2019-06-21 | End: 2019-06-21 | Stop reason: HOSPADM

## 2019-06-21 RX ORDER — OXYTOCIN/RINGER'S LACTATE 10/500ML
PLASTIC BAG, INJECTION (ML) INTRAVENOUS AS NEEDED
Status: DISCONTINUED | OUTPATIENT
Start: 2019-06-21 | End: 2019-06-21 | Stop reason: SURG

## 2019-06-21 RX ORDER — LABETALOL HYDROCHLORIDE 5 MG/ML
5 INJECTION, SOLUTION INTRAVENOUS
Status: DISCONTINUED | OUTPATIENT
Start: 2019-06-21 | End: 2019-06-21 | Stop reason: HOSPADM

## 2019-06-21 RX ADMIN — SODIUM CHLORIDE, POTASSIUM CHLORIDE, SODIUM LACTATE AND CALCIUM CHLORIDE 9 ML/HR: 600; 310; 30; 20 INJECTION, SOLUTION INTRAVENOUS at 20:04

## 2019-06-21 RX ADMIN — PHENYLEPHRINE HYDROCHLORIDE 100 MCG: 10 INJECTION INTRAVENOUS at 20:58

## 2019-06-21 RX ADMIN — FENTANYL CITRATE 50 MCG: 50 INJECTION INTRAMUSCULAR; INTRAVENOUS at 20:23

## 2019-06-21 RX ADMIN — PHENYLEPHRINE HYDROCHLORIDE 100 MCG: 10 INJECTION INTRAVENOUS at 21:01

## 2019-06-21 RX ADMIN — DEXAMETHASONE SODIUM PHOSPHATE 10 MG: 10 INJECTION INTRAMUSCULAR; INTRAVENOUS at 20:25

## 2019-06-21 RX ADMIN — PROPOFOL 50 MG: 10 INJECTION, EMULSION INTRAVENOUS at 20:40

## 2019-06-21 RX ADMIN — ONDANSETRON 4 MG: 2 INJECTION INTRAMUSCULAR; INTRAVENOUS at 20:38

## 2019-06-21 RX ADMIN — PHENYLEPHRINE HYDROCHLORIDE 100 MCG: 10 INJECTION INTRAVENOUS at 20:48

## 2019-06-21 RX ADMIN — FENTANYL CITRATE 50 MCG: 50 INJECTION INTRAMUSCULAR; INTRAVENOUS at 21:46

## 2019-06-21 RX ADMIN — PHENYLEPHRINE HYDROCHLORIDE 100 MCG: 10 INJECTION INTRAVENOUS at 20:51

## 2019-06-21 RX ADMIN — PROPOFOL 200 MG: 10 INJECTION, EMULSION INTRAVENOUS at 20:25

## 2019-06-21 RX ADMIN — SUCCINYLCHOLINE CHLORIDE 170 MG: 20 INJECTION, SOLUTION INTRAMUSCULAR; INTRAVENOUS; PARENTERAL at 20:25

## 2019-06-21 RX ADMIN — PHENYLEPHRINE HYDROCHLORIDE 100 MCG: 10 INJECTION INTRAVENOUS at 20:36

## 2019-06-21 RX ADMIN — FENTANYL CITRATE 50 MCG: 50 INJECTION INTRAMUSCULAR; INTRAVENOUS at 20:40

## 2019-06-21 RX ADMIN — PHENYLEPHRINE HYDROCHLORIDE 200 MCG: 10 INJECTION INTRAVENOUS at 21:09

## 2019-06-21 RX ADMIN — DEXTROSE AND SODIUM CHLORIDE 100 ML/HR: 5; 450 INJECTION, SOLUTION INTRAVENOUS at 23:59

## 2019-06-21 RX ADMIN — OXYTOCIN 20 UNITS: 10 INJECTION, SOLUTION INTRAMUSCULAR; INTRAVENOUS at 20:58

## 2019-06-21 RX ADMIN — MIDAZOLAM 2 MG: 1 INJECTION INTRAMUSCULAR; INTRAVENOUS at 20:04

## 2019-06-21 RX ADMIN — FAMOTIDINE 20 MG: 10 INJECTION INTRAVENOUS at 20:04

## 2019-06-21 RX ADMIN — LIDOCAINE HYDROCHLORIDE 100 MG: 20 INJECTION, SOLUTION INFILTRATION; PERINEURAL at 20:24

## 2019-06-21 RX ADMIN — FENTANYL CITRATE 50 MCG: 50 INJECTION INTRAMUSCULAR; INTRAVENOUS at 20:03

## 2019-06-21 NOTE — ANESTHESIA PREPROCEDURE EVALUATION
Anesthesia Evaluation     Patient summary reviewed   no history of anesthetic complications:  NPO Solid Status: > 8 hours  NPO Liquid Status: > 2 hours           Airway   Mallampati: II  TM distance: >3 FB  Neck ROM: full  Dental      Pulmonary     breath sounds clear to auscultation  (+) a smoker Current Abstained day of surgery,   (-) shortness of breath  Cardiovascular   Exercise tolerance: good (4-7 METS)    Rhythm: regular  Rate: normal    (+) hypertension,   (-) angina, [place]      Neuro/Psych  GI/Hepatic/Renal/Endo    (+) morbid obesity,      Musculoskeletal     Abdominal    Substance History      OB/GYN          Other                        Anesthesia Plan    ASA 3 - emergent     general     intravenous induction   Anesthetic plan, all risks, benefits, and alternatives have been provided, discussed and informed consent has been obtained with: patient.

## 2019-06-22 ENCOUNTER — APPOINTMENT (OUTPATIENT)
Dept: ULTRASOUND IMAGING | Facility: HOSPITAL | Age: 39
End: 2019-06-22

## 2019-06-22 VITALS
TEMPERATURE: 97.7 F | RESPIRATION RATE: 18 BRPM | DIASTOLIC BLOOD PRESSURE: 60 MMHG | SYSTOLIC BLOOD PRESSURE: 115 MMHG | HEART RATE: 59 BPM | OXYGEN SATURATION: 100 %

## 2019-06-22 PROBLEM — E66.01 MORBID OBESITY WITH BMI OF 50.0-59.9, ADULT (HCC): Status: ACTIVE | Noted: 2019-06-22

## 2019-06-22 PROBLEM — D50.0 IRON DEFICIENCY ANEMIA DUE TO CHRONIC BLOOD LOSS: Status: ACTIVE | Noted: 2019-06-22

## 2019-06-22 PROBLEM — O03.1 INCOMPLETE ABORTION WITH DELAYED OR EXCESSIVE HEMORRHAGE: Status: ACTIVE | Noted: 2019-06-22

## 2019-06-22 LAB
ALBUMIN SERPL-MCNC: 3.6 G/DL (ref 3.5–5.2)
ALBUMIN/GLOB SERPL: 1.3 G/DL
ALP SERPL-CCNC: 68 U/L (ref 39–117)
ALT SERPL W P-5'-P-CCNC: 9 U/L (ref 1–33)
ANION GAP SERPL CALCULATED.3IONS-SCNC: 9.7 MMOL/L
AST SERPL-CCNC: 9 U/L (ref 1–32)
BILIRUB SERPL-MCNC: <0.2 MG/DL (ref 0.2–1.2)
BUN BLD-MCNC: 10 MG/DL (ref 6–20)
BUN/CREAT SERPL: 14.3 (ref 7–25)
CALCIUM SPEC-SCNC: 8.4 MG/DL (ref 8.6–10.5)
CHLORIDE SERPL-SCNC: 104 MMOL/L (ref 98–107)
CO2 SERPL-SCNC: 22.3 MMOL/L (ref 22–29)
CREAT BLD-MCNC: 0.7 MG/DL (ref 0.57–1)
DEPRECATED RDW RBC AUTO: 50.1 FL (ref 37–54)
ERYTHROCYTE [DISTWIDTH] IN BLOOD BY AUTOMATED COUNT: 15.7 % (ref 12.3–15.4)
GFR SERPL CREATININE-BSD FRML MDRD: 114 ML/MIN/1.73
GLOBULIN UR ELPH-MCNC: 2.8 GM/DL
GLUCOSE BLD-MCNC: 179 MG/DL (ref 65–99)
HCT VFR BLD AUTO: 24.6 % (ref 34–46.6)
HGB BLD-MCNC: 7.4 G/DL (ref 12–15.9)
MCH RBC QN AUTO: 26.2 PG (ref 26.6–33)
MCHC RBC AUTO-ENTMCNC: 30.1 G/DL (ref 31.5–35.7)
MCV RBC AUTO: 87.2 FL (ref 79–97)
PLATELET # BLD AUTO: 279 10*3/MM3 (ref 140–450)
PMV BLD AUTO: 10.9 FL (ref 6–12)
POTASSIUM BLD-SCNC: 4.3 MMOL/L (ref 3.5–5.2)
PROT SERPL-MCNC: 6.4 G/DL (ref 6–8.5)
RBC # BLD AUTO: 2.82 10*6/MM3 (ref 3.77–5.28)
SODIUM BLD-SCNC: 136 MMOL/L (ref 136–145)
WBC NRBC COR # BLD: 10.99 10*3/MM3 (ref 3.4–10.8)

## 2019-06-22 PROCEDURE — G0378 HOSPITAL OBSERVATION PER HR: HCPCS

## 2019-06-22 PROCEDURE — A9270 NON-COVERED ITEM OR SERVICE: HCPCS | Performed by: OBSTETRICS & GYNECOLOGY

## 2019-06-22 PROCEDURE — 63710000001 OXYCODONE-ACETAMINOPHEN 5-325 MG TABLET: Performed by: OBSTETRICS & GYNECOLOGY

## 2019-06-22 PROCEDURE — 85027 COMPLETE CBC AUTOMATED: CPT | Performed by: OBSTETRICS & GYNECOLOGY

## 2019-06-22 PROCEDURE — 76856 US EXAM PELVIC COMPLETE: CPT

## 2019-06-22 PROCEDURE — 80053 COMPREHEN METABOLIC PANEL: CPT | Performed by: OBSTETRICS & GYNECOLOGY

## 2019-06-22 PROCEDURE — 63710000001 NAPROXEN 250 MG TABLET: Performed by: OBSTETRICS & GYNECOLOGY

## 2019-06-22 PROCEDURE — 99024 POSTOP FOLLOW-UP VISIT: CPT | Performed by: OBSTETRICS & GYNECOLOGY

## 2019-06-22 RX ORDER — OXYCODONE HYDROCHLORIDE AND ACETAMINOPHEN 5; 325 MG/1; MG/1
1-2 TABLET ORAL EVERY 4 HOURS PRN
Qty: 16 TABLET | Refills: 0 | Status: SHIPPED | OUTPATIENT
Start: 2019-06-22 | End: 2019-07-01

## 2019-06-22 RX ORDER — IBUPROFEN 800 MG/1
800 TABLET ORAL EVERY 8 HOURS PRN
Qty: 21 TABLET | Refills: 0 | Status: SHIPPED | OUTPATIENT
Start: 2019-06-22

## 2019-06-22 RX ORDER — FERROUS SULFATE 325(65) MG
325 TABLET ORAL
Qty: 90 TABLET | Refills: 1 | Status: SHIPPED | OUTPATIENT
Start: 2019-06-22

## 2019-06-22 RX ADMIN — NAPROXEN 250 MG: 250 TABLET ORAL at 10:10

## 2019-06-22 RX ADMIN — OXYCODONE HYDROCHLORIDE AND ACETAMINOPHEN 1 TABLET: 5; 325 TABLET ORAL at 10:10

## 2019-06-22 RX ADMIN — OXYCODONE HYDROCHLORIDE AND ACETAMINOPHEN 1 TABLET: 5; 325 TABLET ORAL at 05:18

## 2019-06-22 NOTE — ANESTHESIA POSTPROCEDURE EVALUATION
Patient: Chaim Degroot    Procedure Summary     Date:  06/21/19 Room / Location:  [facility] OR  / [facility] MAIN OR    Anesthesia Start:  2018 Anesthesia Stop:  2128    Procedure:  DILATATION AND CURETTAGE WITH SUCTION (N/A Vagina) Diagnosis:      Surgeon:  Elbert Lynch MD Provider:  Alexis Sen MD    Anesthesia Type:  general ASA Status:  3 - Emergent          Anesthesia Type: general  Last vitals  BP   118/67 (06/21/19 2127)   Temp   37.1 °C (98.8 °F) (06/21/19 2127)   Pulse   96 (06/21/19 2127)   Resp   18 (06/21/19 2127)     SpO2   94 % (06/21/19 2127)     Post Anesthesia Care and Evaluation    Patient location during evaluation: bedside  Patient participation: complete - patient participated  Level of consciousness: awake and alert  Pain management: adequate  Airway patency: patent  Anesthetic complications: No anesthetic complications    Cardiovascular status: acceptable  Respiratory status: acceptable  Hydration status: acceptable    Comments: /67 (BP Location: Left arm, Patient Position: Lying)   Pulse 96   Temp 37.1 °C (98.8 °F) (Oral)   Resp 18   LMP 03/03/2019 (Exact Date)   SpO2 94%

## 2019-06-22 NOTE — H&P
H&P Note    Patient Identification:  Name: Chaim Degroot  Age: 39 y.o.  Sex: female  :  1980  MRN: [id number]                       Chief Complaint: Heavy vaginal bleeding    History of Present Illness:   The patient is a 39-year-old  8 para 5 AB 2 black female who is followed in the office with a 6 weeks size missed  and comes in today being transferred from [facility] emergency room with heavy vaginal bleeding and hemoglobin 9.3.  Blood type is Rh+.  Examination by the nurse and ER doc revealed numerous large clots without parts of conception.  She is now being brought to the OR for suction dilatation and curettage in order to stop her bleeding from actively miscarrying.    Problem List:  [facility]  Past Medical History:  Past Medical History:   Diagnosis Date   • Hypertension     gestational   • Urogenital trichomoniasis      Past Surgical History:  Past Surgical History:   Procedure Laterality Date   •  SECTION        Home Meds:  Medications Prior to Admission   Medication Sig Dispense Refill Last Dose   • doxylamine (UNISOM) 25 MG tablet Take 0.5 tablets by mouth At Night As Needed for Nausea. 45 tablet 2 Past Week at Unknown time   • ferrous sulfate 325 (65 FE) MG tablet Take 1 tablet by mouth Daily With Breakfast. 30 tablet 5 2019 at 0800   • Prenatal Vit-Fe Fumarate-FA (PRENATAL VITAMIN) 27-0.8 MG tablet Take 1 tablet by mouth Daily. 30 tablet 11 2019 at 0800   • vitamin B-6 (PYRIDOXINE) 25 MG tablet Take 1 tablet by mouth 3 (Three) Times a Day. 90 tablet 2 2019 at Unknown time   • docusate sodium (COLACE) 100 MG capsule Take 1 capsule by mouth 2 (Two) Times a Day. 60 capsule 1 Taking   • hydrOXYzine (ATARAX) 25 MG tablet Take 1 tablet by mouth Every 8 (Eight) Hours As Needed for Anxiety. 20 tablet 0 2019     Current Meds:   [unfilled]  Allergies:  Allergies   Allergen Reactions   • Benzonatate Nausea Only   • Promethazine Nausea Only      Immunizations:    There is no immunization history on file for this patient.  Social History:   Social History     Tobacco Use   • Smoking status: Current Every Day Smoker     Packs/day: 0.25     Types: Cigarettes   • Smokeless tobacco: Never Used   Substance Use Topics   • Alcohol use: No     Frequency: Never      Family History:  Family History   Problem Relation Age of Onset   • Breast cancer Neg Hx    • Ovarian cancer Neg Hx    • Uterine cancer Neg Hx    • Colon cancer Neg Hx    • Deep vein thrombosis Neg Hx    • Pulmonary embolism Neg Hx         Review of Systems  Pertinent items are noted in HPI.    Objective:  tMax 24 hrs: No data recorded.    Vitals Ranges:      Intake and Output Last 3 Shifts:   No intake/output data recorded.    Exam:     General Appearance:    Alert, cooperative, no distress, appears stated age   Head:    Normocephalic, without obvious abnormality, atraumatic   Back:     Symmetric, no curvature, ROM normal, no CVA tenderness   Lungs:     Clear to auscultation bilaterally, respirations unlabored   Chest Wall:    No tenderness or deformity    Heart:    Regular rate and rhythm, S1 and S2 normal, no murmur, rub   or gallop       Abdomen:     Soft, non-tender,    no masses, no organomegaly   Pelvic Exam :  Deferred to OR       Extremities:   Extremities normal, atraumatic, no cyanosis or edema   Skin:   Skin color, texture, turgor normal, no rashes or lesions           Data Review:  RH+      RT Immune    Lab Results (last 24 hours)     ** No results found for the last 24 hours. **        Assessment:    * No active hospital problems. *      1. Incomplete      2. Anemia     Plan:  1. Suction D&C  25 minute evaluation in OR Holding Room  including taking history, exam,  Reviewing old records , explaining surgery about to be performed. .  Elbert Lynch MD  2019

## 2019-06-22 NOTE — DISCHARGE SUMMARY
Date of Discharge:  2019    Discharge Diagnosis: Incorporate  with hemorrhage and subsequent anemia    Presenting Problem/History of Present Illness  Active Hospital Problems    Diagnosis  POA   • **Incomplete  with delayed or excessive hemorrhage [O03.1]  Yes   • Iron deficiency anemia due to chronic blood loss [D50.0]  Yes   • Incomplete miscarriage [O03.4]  Yes      Resolved Hospital Problems   No resolved problems to display.          Hospital Course  Patient is a 39 y.o. female presented with heavy vaginal bleeding in early pregnancy.  She underwent a suction D&C without complication.  Postoperative hemoglobin was 7.4 g.  At the time of discharge the patient was taking a regular diet with normal bowel and bladder function and ambulating without difficulty.    Procedures Performed    Procedure(s):  DILATATION AND CURETTAGE WITH SUCTION  -------------------       Consults:   Consults     No orders found for last 30 day(s).          Pertinent Test Results: Hgb 7.4   MBT_ O pos    Condition on Discharge:  stable    Vital Signs  Temp:  [96.7 °F (35.9 °C)-98.8 °F (37.1 °C)] 97.7 °F (36.5 °C)  Heart Rate:  [59-96] 59  Resp:  [18] 18  BP: (100-119)/(59-72) 115/60    Physical Exam:   Abd soft, obese NT.  Bleeding scant.  Ext- no swelling or tenderness.    Discharge Disposition  Home or Self Care    Discharge Medications     Discharge Medications      New Medications      Instructions Start Date   ibuprofen 800 MG tablet  Commonly known as:  ADVIL,MOTRIN   800 mg, Oral, Every 8 Hours PRN      oxyCODONE-acetaminophen 5-325 MG per tablet  Commonly known as:  PERCOCET   1-2 tablets, Oral, Every 4 Hours PRN         Changes to Medications      Instructions Start Date   ferrous sulfate 325 (65 FE) MG tablet  What changed:  Another medication with the same name was added. Make sure you understand how and when to take each.   325 mg, Oral, Daily With Breakfast      ferrous sulfate 325 (65 FE) MG  tablet  What changed:  You were already taking a medication with the same name, and this prescription was added. Make sure you understand how and when to take each.   325 mg, Oral, 3 Times Daily With Meals         Continue These Medications      Instructions Start Date   docusate sodium 100 MG capsule  Commonly known as:  COLACE   100 mg, Oral, 2 Times Daily      doxylamine 25 MG tablet  Commonly known as:  UNISOM   12.5 mg, Oral, Nightly PRN      hydrOXYzine 25 MG tablet  Commonly known as:  ATARAX   25 mg, Oral, Every 8 Hours PRN      Prenatal Vitamin 27-0.8 MG tablet   1 tablet, Oral, Daily      vitamin B-6 25 MG tablet  Commonly known as:  PYRIDOXINE   25 mg, Oral, 3 Times Daily             Discharge Diet:   Diet Instructions     Diet: Regular      Discharge Diet:  Regular          Activity at Discharge:   Activity Instructions     Activity as Tolerated      Pelvic Rest            Follow-up Appointments  No future appointments.  Additional Instructions for the Follow-ups that You Need to Schedule     Discharge Follow-up with Specified Provider: Dr Pugh; 1 Week   As directed      To:  Dr Pugh    Follow Up:  1 Week               Test Results Pending at Discharge   Order Current Status    Tissue Pathology Exam In process           Brandon Coley MD  06/22/19  12:44 PM    Time: Discharge 25 min
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
## 2019-06-22 NOTE — OP NOTE
Operative Note      Chaim Degroot  39 y.o.  1980  female  [id number]      2019    Surgeon(s) and Role:     * Elbert Lynch MD - Primary     Pre-op Diagnosis:   Incomplete  with anemia, morbid obesity    Post-Op Diagnosis Codes:       Post-operative Diagnosis: Same  Surgeon= Syed                   Assist= na  Findings/Complications:  Products of conception    Description of procedure:  Procedure(s) and Anesthesia Type:     * DILATATION AND CURETTAGE WITH SUCTION - General    The patient was brought to the OR with IV running and general endotracheal anesthesia administered and she was placed in the dorsolithotomy position and prepped and draped usual manner for vaginal procedure.  Examination under anesthesia revealed a minimally enlarged anteverted uterus with no adnexal masses.  A Graves speculum was placed in the vagina and a sharptooth tenaculum on the anterior lip of the cervix and the cervix was dilated with Hegar dilators although it was mostly dilated prior to the procedure.  A suction curettage  was then performed with a moderate amount of products of conception obtained.  A large sharp curette and polyp forceps were then used and revealed no additional tissue as did several additional attempts at suctioning.  There was never any evidence of perforation in the endometrial cavity felt empty at the end of the procedure, but it was a very difficult D&C due to the maternal size and distance to the cervix.  20 units of Pitocin were given IV at the end of the procedure as she had some moderate bleeding immediately after but this eventually stopped prior to finishing the surgery.  There is a 300 cc estimated blood loss and no complications and all counts were correct and the patient was then transferred to the recovery room in satisfactory condition.    Anesthesia= General endotracheal    Estimated Blood Loss: 300 mL  Complications none  Specimens:   Order Name Source Comment Collection Info Order  Time   CBC (NO DIFF)   Collected By: Sandra Padilla RN 6/21/2019  8:02 PM   TYPE AND SCREEN Arm, Right  Collected By: Sandra Padilla RN 6/21/2019  8:02 PM    Is patient on Daratumumab?  No      TISSUE PATHOLOGY EXAM Products of Conception  Collected By: Elbert Lynch MD 6/21/2019  9:12 PM       [email or web address]@      Elbert Lynch MD  6/21/2019

## 2019-06-22 NOTE — ANESTHESIA PROCEDURE NOTES
Airway  Urgency: elective    Airway not difficult    General Information and Staff    Patient location during procedure: OR  Anesthesiologist: Alexis Sen MD    Indications and Patient Condition  Indications for airway management: airway protection    Preoxygenated: yes  Mask difficulty assessment: 0 - not attempted    Final Airway Details  Final airway type: endotracheal airway      Successful airway: ETT  Cuffed: yes   Successful intubation technique: video laryngoscopy and RSI  Facilitating devices/methods: cricoid pressure  Endotracheal tube insertion site: oral  Blade: CMAC  Blade size: D  ETT size (mm): 7.5  Cormack-Lehane Classification: grade I - full view of glottis  Placement verified by: chest auscultation and capnometry   Cuff volume (mL): 7  Measured from: teeth  ETT to teeth (cm): 22  Number of attempts at approach: 1    Additional Comments  Easy, atraumatic. Teeth in pre-op condition.

## 2019-06-24 LAB
CYTO UR: NORMAL
LAB AP CASE REPORT: NORMAL
PATH REPORT.FINAL DX SPEC: NORMAL
PATH REPORT.GROSS SPEC: NORMAL

## 2019-07-01 RX ORDER — OXYCODONE HYDROCHLORIDE AND ACETAMINOPHEN 5; 325 MG/1; MG/1
1-2 TABLET ORAL EVERY 4 HOURS PRN
Qty: 16 TABLET | Refills: 0 | OUTPATIENT
Start: 2019-07-01 | End: 2019-07-10

## 2019-07-01 NOTE — TELEPHONE ENCOUNTER
Needs to be seen for narcotic Rx.  Not my patient.  I just discharged her from [facility].

## 2019-07-02 ENCOUNTER — OFFICE VISIT (OUTPATIENT)
Dept: OBSTETRICS AND GYNECOLOGY | Facility: CLINIC | Age: 39
End: 2019-07-02

## 2019-07-02 VITALS
SYSTOLIC BLOOD PRESSURE: 121 MMHG | BODY MASS INDEX: 43.4 KG/M2 | WEIGHT: 293 LBS | HEART RATE: 84 BPM | DIASTOLIC BLOOD PRESSURE: 74 MMHG | HEIGHT: 69 IN

## 2019-07-02 DIAGNOSIS — O02.1 MISSED ABORTION: ICD-10-CM

## 2019-07-02 DIAGNOSIS — O03.9 MISCARRIAGE: Primary | ICD-10-CM

## 2019-07-02 PROCEDURE — 99024 POSTOP FOLLOW-UP VISIT: CPT | Performed by: OBSTETRICS & GYNECOLOGY

## 2019-07-02 PROCEDURE — 81025 URINE PREGNANCY TEST: CPT | Performed by: OBSTETRICS & GYNECOLOGY

## 2019-07-02 RX ORDER — ACETAMINOPHEN AND CODEINE PHOSPHATE 120; 12 MG/5ML; MG/5ML
1 SOLUTION ORAL DAILY
Qty: 28 TABLET | Refills: 12 | Status: SHIPPED | OUTPATIENT
Start: 2019-07-02 | End: 2020-07-01

## 2019-07-02 NOTE — PROGRESS NOTES
Chief Complaint   Patient presents with   • Pre-op Exam     from D&C, pt reports she came off her period yesterday.       Chaim Degroot is a 39 y.o. female who presents to the clinic 2 weeks status post Dilation and curettage for missed  for     Eating a regular diet without difficulty. Bowel movements are normal. The patient is not having any pain.     She stopped bleeding 1 week after the procedure.  She then had some light spotting over the last week.  She had intercourse 4 days ago which was 100% condom protected    Past Medical History:   Diagnosis Date   • Hypertension     gestational   • Urogenital trichomoniasis      Past Surgical History:   Procedure Laterality Date   •  SECTION     • D&C WITH SUCTION N/A 2019    Procedure: DILATATION AND CURETTAGE WITH SUCTION;  Surgeon: Elbert Lynch MD;  Location: [facility];  Service: Obstetrics/Gynecology     Social History     Tobacco Use   • Smoking status: Current Every Day Smoker     Packs/day: 0.25     Types: Cigarettes   • Smokeless tobacco: Never Used   Substance Use Topics   • Alcohol use: No     Frequency: Never   • Drug use: Yes     Types: Marijuana     Comment: stopped 4 weeks ago     Family History   Problem Relation Age of Onset   • Breast cancer Neg Hx    • Ovarian cancer Neg Hx    • Uterine cancer Neg Hx    • Colon cancer Neg Hx    • Deep vein thrombosis Neg Hx    • Pulmonary embolism Neg Hx          Review of Systems   Constitutional: Negative for chills and fever.   Respiratory: Negative for shortness of breath.    Cardiovascular: Negative for chest pain.   Gastrointestinal: Negative for abdominal pain.   Endocrine: Negative for cold intolerance.   Genitourinary: Negative for difficulty urinating, pelvic pain and vaginal bleeding.   Musculoskeletal: Negative for myalgias.   Neurological: Negative for dizziness and light-headedness.       OBJECTIVE:   Vitals:    19 1432   BP: 121/74   Pulse: 84   Weight: (!) 165 kg (363  "lb)   Height: 175.3 cm (69.02\")        Physical Exam   Constitutional: She is oriented to person, place, and time. She appears well-developed and well-nourished. No distress.   HENT:   Head: Normocephalic and atraumatic.   Eyes: EOM are normal. No scleral icterus.   Neck: Normal range of motion.   Cardiovascular: Normal rate and regular rhythm.   Pulmonary/Chest: Effort normal. No respiratory distress.   Abdominal: Soft. She exhibits no distension.   Genitourinary: Vagina normal, uterus normal and cervix normal. There is no rash, tenderness or lesion on the right labia. There is no rash, tenderness or lesion on the left labia.   Genitourinary Comments: Cervix closed   Musculoskeletal: Normal range of motion.   Neurological: She is alert and oriented to person, place, and time.   Skin: Skin is warm and dry. No rash noted. She is not diaphoretic. No erythema.   Psychiatric: She has a normal mood and affect. Her behavior is normal. Judgment and thought content normal.       ASSESSMENT:  S/p D&C for missed     PLAN:   Reviewed postop instructions   Pain control adequate    Patient was counseled on contraceptive options, including oral contraceptive pills, Depo Provera, long acting reversible contraceptive options (Nexplanon, Mirena, Paragard, Kyleena), barrier methods (such as condoms).  Patient is interested in Mirena IUD.  She was counseled on this methods efficacy, how to initiate this method, use of back up contraception. She was counseled on the risk of transmission of STDs and expected changes in the menstrual cycle.  She was counseled on the risks involved with this medication including but not limited to bleeding, injury to surrounding structures, perforation (ending up in abdomen), expulsion (falling out), need for surgery/procedure for removal, pain with insertion and removal, failure, ectopic pregnancy.    To start the contraception we discussed it is necessary that we are reasonably certain that she " is not pregnant.  In order to do this, we must start after period or 2 weeks of abstinence.  She agreed to abide by these conditions.      She was recommended to follow up soon if there are any side effects or problems.      UPT faintly positive. Will get hcg     RTO for IUD insertion (okay to overbook)    ADDENDUM: pt has medicare as primary.  Typically, they will not cover an IUD insertion. We reviewed this with patient and she would like to do the mini pill. She understands the chance of failure, importance of taking at the same time each day. Will trend hcg to zero/negative urine pregnancy test.

## 2019-07-03 DIAGNOSIS — O02.81 INAPPROPRIATE CHANGE IN QUANTITATIVE HUMAN CHORIONIC GONADOTROPIN (HCG) IN EARLY PREGNANCY: ICD-10-CM

## 2019-07-03 DIAGNOSIS — O03.9 MISCARRIAGE: Primary | ICD-10-CM

## 2019-07-03 LAB — HCG INTACT+B SERPL-ACNC: 743 MIU/ML

## 2019-07-12 ENCOUNTER — TELEPHONE (OUTPATIENT)
Dept: OBSTETRICS AND GYNECOLOGY | Facility: CLINIC | Age: 39
End: 2019-07-12

## 2019-07-12 NOTE — TELEPHONE ENCOUNTER
----- Message from Kiesha Pugh MD sent at 7/3/2019  8:19 AM EDT -----  Please let patient know that her pills were sent to pharmacy.  Her pregnancy hormone is significantly lower than 5/21 and so I suspect the level is just resolving. Recommend coming in for repeat blood work in one week. Order placed.    07/08/19  Called patient to inform results, no answer. Left a message to return call.    07/12/19  Pt has been informed of results and declines the pill and wants to start on the depo injection

## 2019-08-19 NOTE — PROGRESS NOTES
Case Management Discharge Note    Final Note: home; self-care. Ani Coelho CSW     Destination      No service has been selected for the patient.      Durable Medical Equipment      No service has been selected for the patient.      Dialysis/Infusion      No service has been selected for the patient.      Home Medical Care      No service has been selected for the patient.      Therapy      No service has been selected for the patient.      Community Resources      No service has been selected for the patient.             Final Discharge Disposition Code: 01 - home or self-care  
Abdomen soft, nontender, nondistended, bowel sounds present in all 4 quadrants.

## 2019-08-20 NOTE — TELEPHONE ENCOUNTER
"13 wk ob pt called to request rx for \"mild dose\" of xanax or something equivalent.  States she has spoke with you about her mental status.  Please advise.    Pt # [phone]   " [facility] with [facility] Pt is eligible for coverage     Reference number. [id number]

## 2021-04-16 ENCOUNTER — BULK ORDERING (OUTPATIENT)
Dept: CASE MANAGEMENT | Facility: OTHER | Age: 41
End: 2021-04-16

## 2021-04-16 DIAGNOSIS — Z23 IMMUNIZATION DUE: ICD-10-CM

## (undated) DEVICE — Device

## (undated) DEVICE — CANSTR SXN UTER NO FLTR SURG

## (undated) DEVICE — HOSE BT TO BT VAC CURETTAGE SNGL PT USE

## (undated) DEVICE — TBG W FLTR FOR BERKELEY SYSTEM

## (undated) DEVICE — LOU D & C HYSTEROSCOPY: Brand: MEDLINE INDUSTRIES, INC.

## (undated) DEVICE — SACK GZ PERM

## (undated) DEVICE — GLV SURG TRIUMPH CLASSIC PF LTX 7.5 STRL

## (undated) DEVICE — STRAP STIRUP WO/ RNG

## (undated) DEVICE — CANN VAC GYN VACURETTE BERKELEY CRV 8MM 1P/U STRL

## (undated) DEVICE — ST COL BERKELY TBG